# Patient Record
Sex: MALE
[De-identification: names, ages, dates, MRNs, and addresses within clinical notes are randomized per-mention and may not be internally consistent; named-entity substitution may affect disease eponyms.]

---

## 2018-12-21 PROBLEM — Z00.00 ENCOUNTER FOR PREVENTIVE HEALTH EXAMINATION: Status: ACTIVE | Noted: 2018-12-21

## 2019-01-14 ENCOUNTER — FORM ENCOUNTER (OUTPATIENT)
Age: 54
End: 2019-01-14

## 2019-01-15 ENCOUNTER — OUTPATIENT (OUTPATIENT)
Dept: OUTPATIENT SERVICES | Facility: HOSPITAL | Age: 54
LOS: 1 days | End: 2019-01-15
Payer: COMMERCIAL

## 2019-01-15 ENCOUNTER — APPOINTMENT (OUTPATIENT)
Dept: MRI IMAGING | Facility: HOSPITAL | Age: 54
End: 2019-01-15

## 2019-01-15 ENCOUNTER — APPOINTMENT (OUTPATIENT)
Dept: HEART AND VASCULAR | Facility: CLINIC | Age: 54
End: 2019-01-15
Payer: SELF-PAY

## 2019-01-15 ENCOUNTER — NON-APPOINTMENT (OUTPATIENT)
Age: 54
End: 2019-01-15

## 2019-01-15 VITALS
SYSTOLIC BLOOD PRESSURE: 145 MMHG | HEIGHT: 71 IN | WEIGHT: 248 LBS | DIASTOLIC BLOOD PRESSURE: 92 MMHG | BODY MASS INDEX: 34.72 KG/M2 | HEART RATE: 80 BPM

## 2019-01-15 DIAGNOSIS — Z82.49 FAMILY HISTORY OF ISCHEMIC HEART DISEASE AND OTHER DISEASES OF THE CIRCULATORY SYSTEM: ICD-10-CM

## 2019-01-15 DIAGNOSIS — Z87.442 PERSONAL HISTORY OF URINARY CALCULI: ICD-10-CM

## 2019-01-15 DIAGNOSIS — Z80.9 FAMILY HISTORY OF MALIGNANT NEOPLASM, UNSPECIFIED: ICD-10-CM

## 2019-01-15 DIAGNOSIS — R55 SYNCOPE AND COLLAPSE: ICD-10-CM

## 2019-01-15 DIAGNOSIS — I10 ESSENTIAL (PRIMARY) HYPERTENSION: ICD-10-CM

## 2019-01-15 DIAGNOSIS — E78.5 HYPERLIPIDEMIA, UNSPECIFIED: ICD-10-CM

## 2019-01-15 PROCEDURE — 75561 CARDIAC MRI FOR MORPH W/DYE: CPT

## 2019-01-15 PROCEDURE — 99205 OFFICE O/P NEW HI 60 MIN: CPT | Mod: 25

## 2019-01-15 PROCEDURE — A9577: CPT

## 2019-01-15 PROCEDURE — 93000 ELECTROCARDIOGRAM COMPLETE: CPT

## 2019-01-15 PROCEDURE — 75561 CARDIAC MRI FOR MORPH W/DYE: CPT | Mod: 26

## 2019-01-15 RX ORDER — METOPROLOL SUCCINATE 25 MG/1
25 TABLET, EXTENDED RELEASE ORAL
Refills: 5 | Status: ACTIVE | COMMUNITY

## 2019-01-15 RX ORDER — THYROID, PORCINE 30 MG/1
30 TABLET ORAL
Refills: 0 | Status: ACTIVE | COMMUNITY

## 2019-01-15 RX ORDER — ESOMEPRAZOLE MAGNESIUM 20 MG/1
20 CAPSULE, DELAYED RELEASE ORAL
Refills: 0 | Status: ACTIVE | COMMUNITY

## 2019-01-15 RX ORDER — METFORMIN HYDROCHLORIDE 500 MG/1
500 TABLET, EXTENDED RELEASE ORAL
Refills: 0 | Status: ACTIVE | COMMUNITY

## 2019-01-15 RX ORDER — AMLODIPINE BESYLATE AND VALSARTAN 5; 160 MG/1; MG/1
5-160 TABLET, FILM COATED ORAL
Refills: 0 | Status: ACTIVE | COMMUNITY

## 2019-01-15 NOTE — PHYSICAL EXAM
[General Appearance - Well Developed] : well developed [Normal Appearance] : normal appearance [Well Groomed] : well groomed [General Appearance - Well Nourished] : well nourished [No Deformities] : no deformities [General Appearance - In No Acute Distress] : no acute distress [Normal Conjunctiva] : the conjunctiva exhibited no abnormalities [Eyelids - No Xanthelasma] : the eyelids demonstrated no xanthelasmas

## 2019-01-18 PROBLEM — R55 SYNCOPE: Status: ACTIVE | Noted: 2019-01-15

## 2019-01-18 NOTE — HISTORY OF PRESENT ILLNESS
[FreeTextEntry1] : 52 y/o M ([facility] resident) h/o HTN, Hypothyroidism, Prostate CA (watchful waiting), GERD, palpitations, near syncope and asymmetric septal hypertrophy who presents for initial evaluation. \par \par Near syncope 12/2016- on vacation in [place], had not eaten in 12-14 hours.  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  No significant episodes since that time.  He does endorse occasional episodes of palpitations that last for a few seconds.  Has not exercised in the last several months due to ongoing cardiac work-up but he does not feel limited in exertional tolerance.\par \par Reports Toprol XL was reduced from 50 mg to 25 mg last week when his blood pressure was checked.  Reports SBP was 120's. \par \par Reports brother has h/o syncope @ age 56. \par No family h/o SCD. \par \par ECHO 7/2018 EF normal, severe asymmetric septal hypertrophy with septum 1.7 cm\par Stress Echo 7/2018 Normal, exercised 9 minutes, normal blood pressure response\par LTM 7/16 - 7/20/18 NSR, NSVT (5 runs, longest 7 beats @ 142 bpm), rare PACs and PVCs

## 2019-01-28 RX ORDER — ASPIRIN 81 MG/1
81 TABLET ORAL
Refills: 0 | Status: ACTIVE | COMMUNITY

## 2019-02-04 ENCOUNTER — FORM ENCOUNTER (OUTPATIENT)
Age: 54
End: 2019-02-04

## 2019-02-04 ENCOUNTER — OUTPATIENT (OUTPATIENT)
Dept: OUTPATIENT SERVICES | Facility: HOSPITAL | Age: 54
LOS: 1 days | Discharge: HOME | End: 2019-02-04
Payer: COMMERCIAL

## 2019-02-04 VITALS
DIASTOLIC BLOOD PRESSURE: 87 MMHG | HEART RATE: 77 BPM | OXYGEN SATURATION: 96 % | TEMPERATURE: 98 F | WEIGHT: 246.48 LBS | RESPIRATION RATE: 17 BRPM | HEIGHT: 71 IN | SYSTOLIC BLOOD PRESSURE: 126 MMHG

## 2019-02-04 DIAGNOSIS — K21.9 GASTRO-ESOPHAGEAL REFLUX DISEASE WITHOUT ESOPHAGITIS: ICD-10-CM

## 2019-02-04 DIAGNOSIS — C61 MALIGNANT NEOPLASM OF PROSTATE: ICD-10-CM

## 2019-02-04 DIAGNOSIS — Z90.49 ACQUIRED ABSENCE OF OTHER SPECIFIED PARTS OF DIGESTIVE TRACT: Chronic | ICD-10-CM

## 2019-02-04 DIAGNOSIS — I10 ESSENTIAL (PRIMARY) HYPERTENSION: ICD-10-CM

## 2019-02-04 DIAGNOSIS — E03.9 HYPOTHYROIDISM, UNSPECIFIED: ICD-10-CM

## 2019-02-04 DIAGNOSIS — I42.2 OTHER HYPERTROPHIC CARDIOMYOPATHY: ICD-10-CM

## 2019-02-04 PROCEDURE — 93641 EP EVL 1/2CHMB PAC CVDFB TST: CPT | Mod: 26

## 2019-02-04 PROCEDURE — 33249 INSJ/RPLCMT DEFIB W/LEAD(S): CPT

## 2019-02-04 RX ORDER — LOSARTAN POTASSIUM 100 MG/1
100 TABLET, FILM COATED ORAL DAILY
Refills: 0 | Status: DISCONTINUED | OUTPATIENT
Start: 2019-02-04 | End: 2019-02-05

## 2019-02-04 RX ORDER — ASPIRIN/CALCIUM CARB/MAGNESIUM 324 MG
81 TABLET ORAL DAILY
Refills: 0 | Status: DISCONTINUED | OUTPATIENT
Start: 2019-02-04 | End: 2019-02-05

## 2019-02-04 RX ORDER — METFORMIN HYDROCHLORIDE 850 MG/1
500 TABLET ORAL DAILY
Refills: 0 | Status: DISCONTINUED | OUTPATIENT
Start: 2019-02-04 | End: 2019-02-05

## 2019-02-04 RX ORDER — CEFAZOLIN SODIUM 1 G
1000 VIAL (EA) INJECTION ONCE
Refills: 0 | Status: COMPLETED | OUTPATIENT
Start: 2019-02-04 | End: 2019-02-04

## 2019-02-04 RX ORDER — AMLODIPINE BESYLATE 2.5 MG/1
5 TABLET ORAL DAILY
Refills: 0 | Status: DISCONTINUED | OUTPATIENT
Start: 2019-02-04 | End: 2019-02-05

## 2019-02-04 RX ORDER — CEFAZOLIN SODIUM 1 G
VIAL (EA) INJECTION
Refills: 0 | Status: COMPLETED | OUTPATIENT
Start: 2019-02-04 | End: 2019-02-05

## 2019-02-04 RX ORDER — CEFAZOLIN SODIUM 1 G
1000 VIAL (EA) INJECTION EVERY 8 HOURS
Refills: 0 | Status: COMPLETED | OUTPATIENT
Start: 2019-02-04 | End: 2019-02-05

## 2019-02-04 RX ORDER — PANTOPRAZOLE SODIUM 20 MG/1
40 TABLET, DELAYED RELEASE ORAL
Refills: 0 | Status: DISCONTINUED | OUTPATIENT
Start: 2019-02-04 | End: 2019-02-05

## 2019-02-04 RX ORDER — ATORVASTATIN CALCIUM 80 MG/1
20 TABLET, FILM COATED ORAL AT BEDTIME
Refills: 0 | Status: DISCONTINUED | OUTPATIENT
Start: 2019-02-04 | End: 2019-02-05

## 2019-02-04 RX ORDER — METOPROLOL TARTRATE 50 MG
25 TABLET ORAL DAILY
Refills: 0 | Status: DISCONTINUED | OUTPATIENT
Start: 2019-02-04 | End: 2019-02-05

## 2019-02-04 RX ADMIN — ATORVASTATIN CALCIUM 20 MILLIGRAM(S): 80 TABLET, FILM COATED ORAL at 21:19

## 2019-02-04 RX ADMIN — METFORMIN HYDROCHLORIDE 500 MILLIGRAM(S): 850 TABLET ORAL at 18:42

## 2019-02-04 RX ADMIN — Medication 25 MILLIGRAM(S): at 18:41

## 2019-02-04 RX ADMIN — Medication 100 MILLIGRAM(S): at 12:15

## 2019-02-04 RX ADMIN — Medication 100 MILLIGRAM(S): at 12:14

## 2019-02-04 RX ADMIN — Medication 100 MILLIGRAM(S): at 21:19

## 2019-02-04 RX ADMIN — Medication 81 MILLIGRAM(S): at 18:41

## 2019-02-04 RX ADMIN — AMLODIPINE BESYLATE 5 MILLIGRAM(S): 2.5 TABLET ORAL at 18:41

## 2019-02-04 RX ADMIN — LOSARTAN POTASSIUM 100 MILLIGRAM(S): 100 TABLET, FILM COATED ORAL at 18:42

## 2019-02-04 NOTE — H&P ADULT - PROBLEM SELECTOR PLAN 1
- Procedure along with associated risks including but not limited to infection, bleeding, cardiac perforation, and pneumothorax were among those discussed. All questions answered. Informed consent signed.   - CXR in am to check lead placement and r/o pneumothorax.  - EP to interrogate device in am.  - Continue metoprolol.

## 2019-02-04 NOTE — H&P ADULT - FAMILY HISTORY
Family history of malignant neoplasm     Sibling  Still living? Unknown  Family history of syncope, Age at diagnosis: Age Unknown

## 2019-02-04 NOTE — H&P ADULT - NSHPPHYSICALEXAM_GEN_ALL_CORE
Constitutional: No acute Distress  Psych: Normal affect, normal mood  Neuro: A/o x 3, No focal deficits  Neck: Supple, NO JVD  CVS: S1 S2, No M/R/G  Pulmonary: CTAB, Breathing unlabored, No Rhonchi/Rales/Wheezing  GI: Soft, Non -tender, +BS x 4 quads  Skin: No rash, warm and dry, no erythematous areas   MSK: 5/5 strength, normal range of motion, no swollen or erythematous  joints.

## 2019-02-04 NOTE — H&P ADULT - ASSESSMENT
52yo M with h/o HTN, hypothyroidism, prostate CA, GERD, palpitations, near syncope, and HCM who presents for ICD implant.

## 2019-02-04 NOTE — H&P ADULT - HISTORY OF PRESENT ILLNESS
54yo M with h/o HTN, hypothyroidism, prostate CA, GERD, palpitations, near syncope, and HCM who presents for ICD implant.     Pt with history of near syncope 12/2016.  Seen by Dr. Haider as part of cardiac work up. Echo 7/2018 showed preserved EF with severe septal hypertrophy with septum 1.7cm. CMR 1/2019 showed hypertrophic cardiomyopathy and non-ischemic pattern of patchy hyperenhancement of the apical anterior, apical inferior and apical lateral walls. Pt presents today feeling well, denies chest pain, SOB, or any further presyncope or syncope.

## 2019-02-05 ENCOUNTER — TRANSCRIPTION ENCOUNTER (OUTPATIENT)
Age: 54
End: 2019-02-05

## 2019-02-05 VITALS
DIASTOLIC BLOOD PRESSURE: 77 MMHG | RESPIRATION RATE: 17 BRPM | SYSTOLIC BLOOD PRESSURE: 135 MMHG | OXYGEN SATURATION: 98 % | HEART RATE: 77 BPM

## 2019-02-05 PROCEDURE — C1892: CPT

## 2019-02-05 PROCEDURE — C1777: CPT

## 2019-02-05 PROCEDURE — 71046 X-RAY EXAM CHEST 2 VIEWS: CPT

## 2019-02-05 PROCEDURE — 33249 INSJ/RPLCMT DEFIB W/LEAD(S): CPT

## 2019-02-05 PROCEDURE — 71046 X-RAY EXAM CHEST 2 VIEWS: CPT | Mod: 26

## 2019-02-05 PROCEDURE — C1882: CPT

## 2019-02-05 RX ORDER — METOCLOPRAMIDE HCL 10 MG
10 TABLET ORAL ONCE
Refills: 0 | Status: COMPLETED | OUTPATIENT
Start: 2019-02-05 | End: 2019-02-05

## 2019-02-05 RX ADMIN — Medication 10 MILLIGRAM(S): at 10:32

## 2019-02-05 RX ADMIN — LOSARTAN POTASSIUM 100 MILLIGRAM(S): 100 TABLET, FILM COATED ORAL at 05:37

## 2019-02-05 RX ADMIN — Medication 100 MILLIGRAM(S): at 05:37

## 2019-02-05 RX ADMIN — Medication 25 MILLIGRAM(S): at 05:37

## 2019-02-05 RX ADMIN — Medication 81 MILLIGRAM(S): at 10:32

## 2019-02-05 RX ADMIN — PANTOPRAZOLE SODIUM 40 MILLIGRAM(S): 20 TABLET, DELAYED RELEASE ORAL at 05:37

## 2019-02-05 RX ADMIN — AMLODIPINE BESYLATE 5 MILLIGRAM(S): 2.5 TABLET ORAL at 05:37

## 2019-02-05 NOTE — DISCHARGE NOTE ADULT - PLAN OF CARE
s/p ICD implant You had an ICD placed yesterday. It is important to allow one month for the lead in the heart to fully heal.  During this time, please avoid lifting the left arm above the level of the shoulder, avoid repetitive arm movements and avoid lifting anything heavier than 5 lbs with the left arm. Please also avoid MRI for the next six weeks.    You may shower, but do not tub bathe or swim for the next month. Do not scrub the incision. There is medical grade glue covering your incision. Leave it in place and do not pick at it. It will fall off over the next couple of weeks. If you notice any bleeding or swelling, please call [phone].     Continue metoprolol. Controlled BP Continue exforge. Prevent reflux Continue nexium. Euthyroid Continue armour thyroid F/u with oncologist/urologist Continue metformin.

## 2019-02-05 NOTE — DISCHARGE NOTE ADULT - PATIENT PORTAL LINK FT
You can access the [facility] Patient Portal, offered by [facility], by registering with the following website: [email or web address]

## 2019-02-05 NOTE — DISCHARGE NOTE ADULT - MEDICATION SUMMARY - MEDICATIONS TO TAKE
I will START or STAY ON the medications listed below when I get home from the hospital:    aspirin 81 mg oral tablet, chewable  -- 1 tab(s) by mouth once a day  -- Indication: For Prevention     Glucophage  mg oral tablet, extended release  -- 1 tab(s) by mouth once a day  -- Indication: For Prostate CA    Lipitor 20 mg oral tablet  -- 1 tab(s) by mouth once a day  -- Indication: For Hypercholesterolemia    Exforge 5 mg-160 mg oral tablet  -- 1 tab(s) by mouth once a day  -- Indication: For Essential hypertension    Metoprolol Succinate ER 25 mg oral tablet, extended release  -- 1 tab(s) by mouth once a day  -- Indication: For Hypertrophic cardiomyopathy    NexIUM 20 mg oral delayed release capsule  -- 1 cap(s) by mouth once a day  -- Indication: For Gastroesophageal reflux disease, esophagitis presence not specified    [place] Thyroid 30 mg oral tablet  -- 1 tab(s) by mouth once a day  -- Indication: For Hypothyroidism, unspecified type

## 2019-02-05 NOTE — DISCHARGE NOTE ADULT - CARE PLAN
Principal Discharge DX:	Hypertrophic cardiomyopathy  Goal:	s/p ICD implant  Assessment and plan of treatment:	You had an ICD placed yesterday. It is important to allow one month for the lead in the heart to fully heal.  During this time, please avoid lifting the left arm above the level of the shoulder, avoid repetitive arm movements and avoid lifting anything heavier than 5 lbs with the left arm. Please also avoid MRI for the next six weeks.    You may shower, but do not tub bathe or swim for the next month. Do not scrub the incision. There is medical grade glue covering your incision. Leave it in place and do not pick at it. It will fall off over the next couple of weeks. If you notice any bleeding or swelling, please call [phone].     Continue metoprolol.  Secondary Diagnosis:	Essential hypertension  Goal:	Controlled BP  Assessment and plan of treatment:	Continue exforge.  Secondary Diagnosis:	Gastroesophageal reflux disease, esophagitis presence not specified  Goal:	Prevent reflux  Assessment and plan of treatment:	Continue nexium.  Secondary Diagnosis:	Hypothyroidism, unspecified type  Goal:	Euthyroid  Assessment and plan of treatment:	Continue armour thyroid  Secondary Diagnosis:	Prostate CA  Goal:	F/u with oncologist/urologist  Assessment and plan of treatment:	Continue metformin.

## 2019-02-05 NOTE — DISCHARGE NOTE ADULT - HOSPITAL COURSE
52yo M with h/o HTN, hypothyroidism, prostate CA, GERD, palpitations, near syncope, and HCM who presents for ICD implant.     Pt with history of near syncope 12/2016.  Seen by Dr. Haider as part of cardiac work up. Echo 7/2018 showed preserved EF with severe septal hypertrophy with septum 1.7cm. CMR 1/2019 showed hypertrophic cardiomyopathy and non-ischemic pattern of patchy hyperenhancement of the apical anterior, apical inferior and apical lateral walls. Pt presents today feeling well, denies chest pain, SOB, or any further presyncope or syncope.     Now s/p ICD implant. The procedure was successful and uncomplicated.     Wound:  CXR:  Device interrogation:     Plan: Discharge home with f/u ... 52yo M with h/o HTN, hypothyroidism, prostate CA, GERD, palpitations, near syncope, and HCM who presents for ICD implant.     Pt with history of near syncope 12/2016.  Seen by Dr. Haider as part of cardiac work up. Echo 7/2018 showed preserved EF with severe septal hypertrophy with septum 1.7cm. CMR 1/2019 showed hypertrophic cardiomyopathy and non-ischemic pattern of patchy hyperenhancement of the apical anterior, apical inferior and apical lateral walls. Pt presents today feeling well, denies chest pain, SOB, or any further presyncope or syncope.     Now s/p ICD implant. The procedure was successful and uncomplicated.     Wound: Incision approximated. Dermabond in place. No bleeding or hematoma.   CXR (2/5/19): Good lead placement, no pneumothorax  Device interrogation: Normal device check. See procedure note for further details.     Plan: Discharge home with f/u next week on Tuesday.

## 2019-02-05 NOTE — DISCHARGE NOTE ADULT - CARE PROVIDER_API CALL
George Haider (MD)  Cardiac Electrophysiology; Cardiovascular Disease  [address] [place] [address]  Phone: [phone]  Fax: [phone]  Follow Up Time:

## 2019-02-05 NOTE — DISCHARGE NOTE ADULT - SECONDARY DIAGNOSIS.
Essential hypertension Gastroesophageal reflux disease, esophagitis presence not specified Hypothyroidism, unspecified type Prostate CA

## 2019-02-12 ENCOUNTER — APPOINTMENT (OUTPATIENT)
Dept: HEART AND VASCULAR | Facility: CLINIC | Age: 54
End: 2019-02-12
Payer: SELF-PAY

## 2019-02-12 VITALS — DIASTOLIC BLOOD PRESSURE: 88 MMHG | HEART RATE: 76 BPM | SYSTOLIC BLOOD PRESSURE: 139 MMHG

## 2019-02-12 VITALS — HEIGHT: 71 IN | BODY MASS INDEX: 35 KG/M2 | WEIGHT: 250 LBS

## 2019-02-12 DIAGNOSIS — Z95.810 PRESENCE OF AUTOMATIC (IMPLANTABLE) CARDIAC DEFIBRILLATOR: ICD-10-CM

## 2019-02-12 DIAGNOSIS — I42.2 OTHER HYPERTROPHIC CARDIOMYOPATHY: ICD-10-CM

## 2019-02-12 DIAGNOSIS — I47.2 VENTRICULAR TACHYCARDIA: ICD-10-CM

## 2019-02-12 PROCEDURE — 93282 PRGRMG EVAL IMPLANTABLE DFB: CPT

## 2019-02-15 PROBLEM — Z95.810 ICD (IMPLANTABLE CARDIOVERTER-DEFIBRILLATOR) IN PLACE: Status: ACTIVE | Noted: 2019-02-15

## 2019-02-15 PROBLEM — I47.2 NSVT (NONSUSTAINED VENTRICULAR TACHYCARDIA): Status: ACTIVE | Noted: 2019-01-15

## 2019-02-15 PROBLEM — I42.2 ASYMMETRIC SEPTAL HYPERTROPHY: Status: ACTIVE | Noted: 2019-01-15

## 2019-02-15 NOTE — PHYSICAL EXAM
[General Appearance - Well Developed] : well developed [Normal Appearance] : normal appearance [Well Groomed] : well groomed [General Appearance - Well Nourished] : well nourished [No Deformities] : no deformities [General Appearance - In No Acute Distress] : no acute distress [Normal Conjunctiva] : the conjunctiva exhibited no abnormalities [Eyelids - No Xanthelasma] : the eyelids demonstrated no xanthelasmas [Left Infraclavicular] : left infraclavicular area [Clean] : clean [Dry] : dry [Healing Well] : healing well [Palpable Crepitus] : no palpable crepitus [Bleeding] : no active bleeding [Foul Odor] : no foul smell [Purulent Drainage] : no purulent drainage [Serosanguineous Drainage] : no serosanquineous drainage [Serous Drainage] : no serous drainage [Erythema] : not erythematous [Warm] : not warm [Tender] : not tender [Indurated] : not indurated [Fluctuant] : not fluctuant

## 2019-02-15 NOTE — HISTORY OF PRESENT ILLNESS
[FreeTextEntry1] : 54 y/o M ([facility] resident) h/o HTN, Hypothyroidism, Prostate CA (watchful waiting), GERD, palpitations, near syncope, HCM with significant scar burden on CMR now s/p single chamber ICD placement 2/4/19 for primary prevention of SCD.  He presents for post procedure follow-up today.  He feels well and offers no device related complaints.  His flight back to [facility] is delayed today due to inclimate weather.\par \par Reports brother has h/o syncope @ age 56. \par No family h/o SCD. \par \par ECHO 7/2018 EF normal, severe asymmetric septal hypertrophy with septum 1.7 cm\par Stress Echo 7/2018 Normal, exercised 9 minutes, normal blood pressure response\par LTM 7/16 - 7/20/18 NSR, NSVT (5 runs, longest 7 beats @ 142 bpm), rare PACs and PVCs

## 2019-02-15 NOTE — PROCEDURE
[No] : not [NSR] : normal sinus rhythm [ICD] : Implantable cardioverter-defibrillator [Lead Imp:  ___ohms] : lead impedance was [unfilled] ohms [Sensing Amplitude ___mv] : sensing amplitude was [unfilled] mv [___V @] : [unfilled] V [___ ms] : [unfilled] ms [None] : none [de-identified] : [email or web address] [de-identified] : [employer] [de-identified] : 2/4/19 [de-identified] : 1 brief episode NSVT

## 2019-08-13 ENCOUNTER — APPOINTMENT (OUTPATIENT)
Dept: HEART AND VASCULAR | Facility: CLINIC | Age: 54
End: 2019-08-13

## 2020-01-21 ENCOUNTER — APPOINTMENT (OUTPATIENT)
Dept: HEART AND VASCULAR | Facility: CLINIC | Age: 55
End: 2020-01-21

## 2022-06-17 NOTE — DISCHARGE NOTE ADULT - NS TRANSFER DENTURES
Chief Complaint   Patient presents with     Port Draw     Power needle, heparin locked, vitals checked     Alisa Brand RN on 6/17/2022 at 2:03 PM    
none

## 2023-10-29 RX ORDER — AMLODIPINE AND VALSARTAN 5; 320 MG/1; MG/1
1 TABLET, FILM COATED ORAL
Qty: 0 | Refills: 0 | DISCHARGE

## 2023-10-29 RX ORDER — METOPROLOL TARTRATE 50 MG
1 TABLET ORAL
Qty: 0 | Refills: 0 | DISCHARGE

## 2023-10-29 RX ORDER — ESOMEPRAZOLE MAGNESIUM 40 MG/1
1 CAPSULE, DELAYED RELEASE ORAL
Qty: 0 | Refills: 0 | DISCHARGE

## 2023-10-29 RX ORDER — METFORMIN HYDROCHLORIDE 850 MG/1
1 TABLET ORAL
Qty: 0 | Refills: 0 | DISCHARGE

## 2023-10-29 RX ORDER — THYROID 120 MG
1 TABLET ORAL
Qty: 0 | Refills: 0 | DISCHARGE

## 2023-10-29 RX ORDER — ATORVASTATIN CALCIUM 80 MG/1
1 TABLET, FILM COATED ORAL
Qty: 0 | Refills: 0 | DISCHARGE

## 2023-10-29 RX ORDER — ASPIRIN/CALCIUM CARB/MAGNESIUM 324 MG
1 TABLET ORAL
Qty: 0 | Refills: 0 | DISCHARGE

## 2025-07-03 PROBLEM — C61 PROSTATE CANCER: Status: ACTIVE | Noted: 2025-07-03

## 2025-07-07 PROBLEM — K21.9 GASTRO-ESOPHAGEAL REFLUX DISEASE WITHOUT ESOPHAGITIS: Chronic | Status: ACTIVE | Noted: 2019-02-04

## 2025-07-07 PROBLEM — E03.9 HYPOTHYROIDISM, UNSPECIFIED: Chronic | Status: ACTIVE | Noted: 2019-02-04

## 2025-07-07 PROBLEM — C61 MALIGNANT NEOPLASM OF PROSTATE: Chronic | Status: ACTIVE | Noted: 2019-02-04

## 2025-07-07 PROBLEM — N20.0 CALCULUS OF KIDNEY: Chronic | Status: ACTIVE | Noted: 2019-02-04

## 2025-07-07 PROBLEM — I10 ESSENTIAL (PRIMARY) HYPERTENSION: Chronic | Status: ACTIVE | Noted: 2019-02-04

## 2025-07-07 PROBLEM — I42.2 OTHER HYPERTROPHIC CARDIOMYOPATHY: Chronic | Status: ACTIVE | Noted: 2019-02-04

## 2025-07-16 ENCOUNTER — OUTPATIENT (OUTPATIENT)
Dept: OUTPATIENT SERVICES | Facility: HOSPITAL | Age: 60
LOS: 1 days | End: 2025-07-16
Payer: COMMERCIAL

## 2025-07-16 ENCOUNTER — APPOINTMENT (OUTPATIENT)
Dept: NUCLEAR MEDICINE | Facility: HOSPITAL | Age: 60
End: 2025-07-16

## 2025-07-16 ENCOUNTER — RESULT REVIEW (OUTPATIENT)
Age: 60
End: 2025-07-16

## 2025-07-16 DIAGNOSIS — Z90.49 ACQUIRED ABSENCE OF OTHER SPECIFIED PARTS OF DIGESTIVE TRACT: Chronic | ICD-10-CM

## 2025-07-16 PROCEDURE — 82962 GLUCOSE BLOOD TEST: CPT

## 2025-07-16 PROCEDURE — 78816 PET IMAGE W/CT FULL BODY: CPT | Mod: 26

## 2025-07-17 ENCOUNTER — NON-APPOINTMENT (OUTPATIENT)
Age: 60
End: 2025-07-17

## 2025-07-17 ENCOUNTER — APPOINTMENT (OUTPATIENT)
Dept: HEART AND VASCULAR | Facility: CLINIC | Age: 60
End: 2025-07-17
Payer: COMMERCIAL

## 2025-07-17 ENCOUNTER — APPOINTMENT (OUTPATIENT)
Dept: MRI IMAGING | Facility: HOSPITAL | Age: 60
End: 2025-07-17
Payer: COMMERCIAL

## 2025-07-17 ENCOUNTER — OUTPATIENT (OUTPATIENT)
Dept: OUTPATIENT SERVICES | Facility: HOSPITAL | Age: 60
LOS: 1 days | End: 2025-07-17

## 2025-07-17 VITALS
OXYGEN SATURATION: 97 % | BODY MASS INDEX: 31.64 KG/M2 | DIASTOLIC BLOOD PRESSURE: 88 MMHG | WEIGHT: 226 LBS | HEIGHT: 71 IN | TEMPERATURE: 97.3 F | HEART RATE: 68 BPM | SYSTOLIC BLOOD PRESSURE: 137 MMHG

## 2025-07-17 DIAGNOSIS — Z90.49 ACQUIRED ABSENCE OF OTHER SPECIFIED PARTS OF DIGESTIVE TRACT: Chronic | ICD-10-CM

## 2025-07-17 PROBLEM — I51.7 ASYMMETRIC SEPTAL HYPERTROPHY: Status: ACTIVE | Noted: 2019-01-15

## 2025-07-17 PROCEDURE — 71045 X-RAY EXAM CHEST 1 VIEW: CPT | Mod: 26

## 2025-07-17 PROCEDURE — 99204 OFFICE O/P NEW MOD 45 MIN: CPT

## 2025-07-17 PROCEDURE — 72197 MRI PELVIS W/O & W/DYE: CPT | Mod: 26

## 2025-07-17 PROCEDURE — 93289 INTERROG DEVICE EVAL HEART: CPT

## 2025-07-23 ENCOUNTER — RESULT REVIEW (OUTPATIENT)
Age: 60
End: 2025-07-23

## 2025-07-23 ENCOUNTER — OUTPATIENT (OUTPATIENT)
Dept: OUTPATIENT SERVICES | Facility: HOSPITAL | Age: 60
LOS: 1 days | End: 2025-07-23
Payer: COMMERCIAL

## 2025-07-23 DIAGNOSIS — Z90.49 ACQUIRED ABSENCE OF OTHER SPECIFIED PARTS OF DIGESTIVE TRACT: Chronic | ICD-10-CM

## 2025-07-23 DIAGNOSIS — R97.20 ELEVATED PROSTATE SPECIFIC ANTIGEN [PSA]: ICD-10-CM

## 2025-07-23 PROCEDURE — C8001: CPT

## 2025-07-23 PROCEDURE — 76377 3D RENDER W/INTRP POSTPROCES: CPT | Mod: 26
